# Patient Record
Sex: FEMALE | Race: WHITE
[De-identification: names, ages, dates, MRNs, and addresses within clinical notes are randomized per-mention and may not be internally consistent; named-entity substitution may affect disease eponyms.]

---

## 2017-01-19 ENCOUNTER — HOSPITAL ENCOUNTER (OUTPATIENT)
Dept: HOSPITAL 39 - MAMMO | Age: 82
End: 2017-01-19
Attending: SURGERY
Payer: MEDICARE

## 2017-01-19 DIAGNOSIS — N63: Primary | ICD-10-CM

## 2017-01-19 PROCEDURE — 76641 ULTRASOUND BREAST COMPLETE: CPT

## 2017-01-19 PROCEDURE — 77051: CPT

## 2017-01-26 NOTE — US
EXAM DESCRIPTION:

MAMMO BREAST DIAGNOSTIC UNILATERAL  ; US BREAST UNILATERAL

Images were reviewed with R2 computer-aided detection.



CLINICAL HISTORY:

Personal history of breast cancer with new palpable lump left breast.

Right mastectomy.



COMPARISON:

2016 and 2015 exams



FINDINGS:

Routine views are obtained.  Glandular tissue is heterogeneously dense

with stable distribution since initial study at this facility in 2015. 

Course segmentally distributed calcifications in the upper outer left

breast are stable to perhaps minimally increased since the prior exam. No

architectural distortion and no dominant mass. The glandular pattern is

nodular in contour and appears stable.

Patient had coarse microcalcifications on the contralateral breast in a

similar distribution. The pattern of calcifications makes exclusion of a

low-grade intraductal disease difficult.  Review of pathology report from

the right mastectomy does not mention intraductal disease although the

report centers on the index lesion. Careful follow-up in this elderly

patient is felt to be appropriate.



Ultrasound exam is performed of the upper 1/2 of the left breast. Normal

fibrofatty and fibroglandular tissue ridges. No sonographically

suspicious finding.  The region of clinical concern at 11 o'clock

corresponds to a normal fibrofatty ridge of tissue.



IMPRESSION:

Probably benign exam.



BIRAD CATEGORY: 3 PROBABLY BENIGN

RECOMMENDATION:

 

FOLLOW-UP: Six-month followup mammography recommended. Probably benign

microcalcifications left breast.  , see above.



According to the [employer], yearly mammograms are

recommended starting at age 40 and continuing as long as a woman is in

good health.  Any breast change noted on a breast self-exam should be

reported promptly to the patient's healthcare provider.  Breast MRI is

recommended for women with an approximately 20-25% or greater lifetime

risk of breast cancer, including women with a strong family history of

breast or ovarian cancer and women who have been treated for Hodgkin's

disease.



Electronically signed by: Suzanne Modi  01/26/2017 15:05

## 2017-05-11 ENCOUNTER — HOSPITAL ENCOUNTER (OUTPATIENT)
Dept: HOSPITAL 39 - GMA | Age: 82
Discharge: HOME | End: 2017-05-11
Attending: NURSE PRACTITIONER
Payer: MEDICARE

## 2017-05-11 DIAGNOSIS — N30.00: Primary | ICD-10-CM

## 2017-11-01 ENCOUNTER — HOSPITAL ENCOUNTER (OUTPATIENT)
Dept: HOSPITAL 39 - GMAJ | Age: 82
End: 2017-11-01
Attending: FAMILY MEDICINE
Payer: MEDICARE

## 2017-11-01 DIAGNOSIS — I10: Primary | ICD-10-CM

## 2017-11-17 ENCOUNTER — HOSPITAL ENCOUNTER (OUTPATIENT)
Dept: HOSPITAL 39 - CT | Age: 82
Discharge: HOME | End: 2017-11-17
Attending: INTERNAL MEDICINE
Payer: MEDICARE

## 2017-11-17 DIAGNOSIS — C50.211: Primary | ICD-10-CM

## 2017-11-20 NOTE — CT
EXAM DESCRIPTION: 



Chest w/Contrast (accession [id number]), Abdomen/Pelvis w/wo

Contrast (accession [id number])



CLINICAL HISTORY: 



81 years, Female, RIGHT SIDED MALIGNANT NEOPLASM OF RIGHT BREAST



COMPARISON: 



None



TECHNIQUE: 



Thin-section axial CT images are obtained during rapid bolus

administration of nonionic IV contrast media. Reconstructed MPR

images are created and reviewed as well.



This exam was performed according to our departmental

dose-optimization program, which includes automated exposure

control, adjustment of the mA and/or kV according to patient size

and/or use of iterative reconstruction technique.



FINDINGS: 



Preliminary noncontrast imaging of the abdomen and pelvis

demonstrate normal caliber bowel without obvious hepatic mass or

renal or gallbladder calculi. Aortic calcification without

aneurysm is noted. The uterus is surgically absent. A small

sliding-type hiatal hernia and a tiny amount of fluid adjacent to

the esophagus in the posterior inferior mediastinum is

incidentally noted and nonspecific.



Enhanced examination of the chest demonstrates surgical absence

of the right breast with no definite axillary adenopathy. Dense

tissue and thickening of the left breast particularly inferiorly

is noted with extensive calcification. Correlation with physical

examination and if necessary mammography is recommended to be

certain there is no evidence of left breast pathology. On the

left a small fat-containing Bochdalek hernia posterior laterally

is noted. Mild eventration of the right hemidiaphragm with

compressive atelectasis or scarring at the posterior right lung

base is noted. Similar changes on the left are not apparent. No

pleural effusions or metastatic nodules within the lungs are

noted. Thoracic inlet and superior mediastinum as well as the

hilar and mediastinal structures are normal.



Enhanced examination of the abdomen demonstrates normal

enhancement of the liver with well distended gallbladder without

definite stones or ductal dilation. The spleen and pancreas and

adrenal glands are normal. Normal enhancement of the kidneys are

noted. Aortic calcification without aneurysm is noted. Normal

renal function and a single collecting system on each side is

noted. Within the pelvis the bladder distends normally and no

adnexal masses are noted.



Large and small bowel caliber is normal without mesenteric

adenopathy or evidence of bowel obstruction. The anterior

abdominal wall is intact. Advanced multilevel degenerative disc

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

changes. Mild diverticulosis of the left colon is noted.



IMPRESSION: 



1.  Negative CT of the abdomen and pelvis and chest for

metastatic disease.

2.  Surgical absence of the right breast. Dense breast tissue and

thickening of the left breast, particularly inferiorly.

Correlation with physical examination and recent or current

mammography to exclude any left breast pathology is recommended

3.  Small hiatal hernia and surgical absence of the uterus and

mild left colonic diverticulosis.

4.  Eventration and elevation of the right hemidiaphragm and

small fat-containing Bochdalek hernia of the left hemidiaphragm

posterior laterally.



Electronically signed by:  Wade Nix MD  11/20/2017 8:38 AM

[facility] Workstation: [id number]

## 2017-11-20 NOTE — CT
EXAM DESCRIPTION: 



Chest w/Contrast (accession [id number]), Abdomen/Pelvis w/wo

Contrast (accession [id number])



CLINICAL HISTORY: 



81 years, Female, RIGHT SIDED MALIGNANT NEOPLASM OF RIGHT BREAST



COMPARISON: 



None



TECHNIQUE: 



Thin-section axial CT images are obtained during rapid bolus

administration of nonionic IV contrast media. Reconstructed MPR

images are created and reviewed as well.



This exam was performed according to our departmental

dose-optimization program, which includes automated exposure

control, adjustment of the mA and/or kV according to patient size

and/or use of iterative reconstruction technique.



FINDINGS: 



Preliminary noncontrast imaging of the abdomen and pelvis

demonstrate normal caliber bowel without obvious hepatic mass or

renal or gallbladder calculi. Aortic calcification without

aneurysm is noted. The uterus is surgically absent. A small

sliding-type hiatal hernia and a tiny amount of fluid adjacent to

the esophagus in the posterior inferior mediastinum is

incidentally noted and nonspecific.



Enhanced examination of the chest demonstrates surgical absence

of the right breast with no definite axillary adenopathy. Dense

tissue and thickening of the left breast particularly inferiorly

is noted with extensive calcification. Correlation with physical

examination and if necessary mammography is recommended to be

certain there is no evidence of left breast pathology. On the

left a small fat-containing Bochdalek hernia posterior laterally

is noted. Mild eventration of the right hemidiaphragm with

compressive atelectasis or scarring at the posterior right lung

base is noted. Similar changes on the left are not apparent. No

pleural effusions or metastatic nodules within the lungs are

noted. Thoracic inlet and superior mediastinum as well as the

hilar and mediastinal structures are normal.



Enhanced examination of the abdomen demonstrates normal

enhancement of the liver with well distended gallbladder without

definite stones or ductal dilation. The spleen and pancreas and

adrenal glands are normal. Normal enhancement of the kidneys are

noted. Aortic calcification without aneurysm is noted. Normal

renal function and a single collecting system on each side is

noted. Within the pelvis the bladder distends normally and no

adnexal masses are noted.



Large and small bowel caliber is normal without mesenteric

adenopathy or evidence of bowel obstruction. The anterior

abdominal wall is intact. Advanced multilevel degenerative disc

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

changes. Mild diverticulosis of the left colon is noted.



IMPRESSION: 



1.  Negative CT of the abdomen and pelvis and chest for

metastatic disease.

2.  Surgical absence of the right breast. Dense breast tissue and

thickening of the left breast, particularly inferiorly.

Correlation with physical examination and recent or current

mammography to exclude any left breast pathology is recommended

3.  Small hiatal hernia and surgical absence of the uterus and

mild left colonic diverticulosis.

4.  Eventration and elevation of the right hemidiaphragm and

small fat-containing Bochdalek hernia of the left hemidiaphragm

posterior laterally.



Electronically signed by:  Wade Nix MD  11/20/2017 8:38 AM

[facility] Workstation: [id number]

## 2018-04-04 ENCOUNTER — HOSPITAL ENCOUNTER (OUTPATIENT)
Dept: HOSPITAL 39 - MAMMO | Age: 83
Discharge: HOME | End: 2018-04-04
Attending: SURGERY
Payer: MEDICARE

## 2018-04-04 DIAGNOSIS — N63.22: Primary | ICD-10-CM

## 2018-04-04 PROCEDURE — 77065 DX MAMMO INCL CAD UNI: CPT

## 2018-04-06 NOTE — MAM
EXAM DESCRIPTION: 

3D Diagnostic, Left: Digital Mammography



CLINICAL HISTORY: 

82 yearsFemaleFOLLOW UP FOR CALCS . Multiple calcifications in

the left breast. No complaints..



COMPARISON: 

Diagnostic left breast 2-D mammogram and targeted left breast

ultrasound 1/19/2017.    Reports from prior examinations also

reviewed.



TECHNIQUE: 

Left breast CC LM MLO projection full-field images, 3-D

tomosynthesis digital mammographic technique. Also left breast

synthesized CC MLO  LM full-field images.  Magnification views of

the anterior two thirds of the left breast in the CC and LM

projections. CAD not utilized.



FINDINGS: 

The breast parenchymal density pattern is:   Heterogeneously

dense breast tissue, which may obscure small masses.    Multiple

groups of coarse calcifications are present as well as vascular

calcifications. There are also diffuse microcalcifications. 

No focal, stellate mass or density, focal asymmetry , and no

suspicious microcalcifications left breast. Stable mammograms

compared to prior study, taking into account differences in

mammographic technique



IMPRESSION: 

BI-RADS CATEGORY: 3 - PROBABLY BENIGN.

Management: Short interval (6-month) follow-up digital

mammography. Left breast.



Written communication explaining the IMPRESSION and follow-up

will be mailed to the patient and referring care provider



Electronically signed by:  Aman Geiger MD  4/6/2018 9:35 AM CDT

Workstation: [id number]

## 2018-10-10 ENCOUNTER — HOSPITAL ENCOUNTER (OUTPATIENT)
Dept: HOSPITAL 39 - MAMMO | Age: 83
End: 2018-10-10
Attending: SURGERY
Payer: MEDICARE

## 2018-10-10 DIAGNOSIS — N63.22: Primary | ICD-10-CM

## 2018-10-10 PROCEDURE — 77065 DX MAMMO INCL CAD UNI: CPT

## 2018-10-10 NOTE — MAM
EXAM DESCRIPTION: 

3D Diagnostic, Left: Digital Mammography



CLINICAL HISTORY: 

82 yearsFemale6 mos f/u microcalcifications and coarse

calcifications in the left breast. Right breast cancer and

mastectomy 2014. No remote family history of breast cancer. No

childbirth. Hysterectomy. No HRT.  Lifetime risk of developing

breast cancer (Tyrer-Cuzick model) percentage is not calculated

due to having developed breast cancer.



COMPARISON: 

Left diagnostic digital breast tomosynthesis 4/4/2018...



TECHNIQUE: 

Left CC LM MLO projection full-field images,  digital

mammographic tomosynthesis technique.  Full-field 2-D MLO

projection. CAD not utilized.  





FINDINGS: 

The breast parenchymal density pattern is: Heterogeneously dense

breast tissue, which may obscure small masses.  No skin

thickening or nipple retraction  again noted is dense tissue in

the anterior two thirds of the breast with vascular

calcifications microcalcifications and coarse calcifications.

Microcalcifications in some small groups are varying in size but

not suspicious morphology or pleomorphism.

No new focal, stellate mass or density, focal asymmetry , and no

suspicious microcalcifications left breast. Stable left breast

mammograms compared to prior study.



IMPRESSION: 

BI-RADS CATEGORY: 3 - PROBABLY BENIGN.

Management: Short interval (6-month) follow-up diagnostic breast

digital mammography April 2019..



Written communication explaining the IMPRESSION and follow-up

will be mailed to the patient and referring care provider



Electronically signed by:  Aman Geiger MD  10/10/2018 2:13 PM

CDT Workstation: [id number]